# Patient Record
Sex: FEMALE | Race: WHITE
[De-identification: names, ages, dates, MRNs, and addresses within clinical notes are randomized per-mention and may not be internally consistent; named-entity substitution may affect disease eponyms.]

---

## 2017-04-06 ENCOUNTER — HOSPITAL ENCOUNTER (OUTPATIENT)
Dept: HOSPITAL 53 - M WHC | Age: 57
End: 2017-04-06
Attending: NURSE PRACTITIONER
Payer: COMMERCIAL

## 2017-04-06 ENCOUNTER — HOSPITAL ENCOUNTER (OUTPATIENT)
Dept: HOSPITAL 53 - M SFHCWAGY | Age: 57
End: 2017-04-06
Attending: NURSE PRACTITIONER
Payer: COMMERCIAL

## 2017-04-06 DIAGNOSIS — Z78.0: ICD-10-CM

## 2017-04-06 DIAGNOSIS — Z80.3: ICD-10-CM

## 2017-04-06 DIAGNOSIS — Z92.0: ICD-10-CM

## 2017-04-06 DIAGNOSIS — R92.2: Primary | ICD-10-CM

## 2017-04-06 DIAGNOSIS — Z01.419: Primary | ICD-10-CM

## 2017-04-06 NOTE — REPMRS
Patient History

The patient states she had a clinical breast exam in 04/17

Patient is postmenopausal.

Family history of breast cancer in mother under age 50 and breast

cancer in paternal aunt at age 50 or over.

Took unspecified hormones for 1 year.

 

Digital Woman Screen Mammo: April 6, 2017 - Exam #: 

[id number]

Bilateral CC and MLO view(s) were taken.

 

Technologist: Didi Anderson, Technologist

Prior study comparison: February 8, 2016, digital woman screen 

mammo performed at [facility].  January 13, 2015, 

right breast digital mammo diagnostic unilateral, performed at 

[facility].  January 8, 2015, digital woman screen 

mammo performed at [facility].  December 10, 2013, 

digital woman screen mammo performed at [facility].

 

FINDINGS: The breast tissue is heterogeneously dense.  This may 

lower the sensitivity of mammography.  There is a moderate amount

of heterogeneously dense fibroglandular tissue which is fairly 

symmetric. There is no interval development of dominant mass, 

architectural distortion, or clustered microcalcification typical

of malignancy. There has been no change in the appearance of the

mammogram from the prior studies.

 

ASSESSMENT: BI-RADS/ACR category 1 mammogram. Negative.

 

Recommendation

Routine screening mammogram of both breasts in 1 year (for women 

over age 40).

 

This mammogram was interpreted with the aid of an FDA-approved 

computer-aided dectection system.

 

Electronically Signed By: Cesar Enriquez MD 04/06/17 7018

## 2017-06-09 ENCOUNTER — HOSPITAL ENCOUNTER (OUTPATIENT)
Dept: HOSPITAL 53 - M WHC | Age: 57
End: 2017-06-09
Attending: FAMILY MEDICINE
Payer: COMMERCIAL

## 2017-06-09 DIAGNOSIS — M81.0: Primary | ICD-10-CM

## 2017-06-12 NOTE — DEXA
AP SPINE   L1 - L4   1.134   -0.5       0.4

LT FEMUR   TOTAL   0.742   -2.1      -1.4

RT FEMUR   TOTAL   0.709   -2.4      -1.6

TOTAL BODY   TOTAL

OTHER



DUAL FEMUR FRAX* ASSESSMENT

Risk factors:               History hand fracture.

10 year probability of fracture

Major osteoporotic fracture            14.6 %

Hip fracture                 2.9 %



COMMENTS:

Normal bone densitometry of the spine.

There is low bone density of the hips.

The density of the spine has increased 2.0% since the initial exam on 12/22/
2010.

The spine density has increased 3.4% since the most recent exam on 01/08/2015.

The density of the left hip has increased 2.9% since the initial exam on 12/22/
2010.

The density of the left hip has decreased 0.7% since the most recent exam on 01/
08/2015.

The density of the right hip has increased 1.3% since the initial exam on 12/22/
2010.

The density of the right hip has increased 2.6% since the most recent exam on 01
/08/2015.



FOLLOW-UP:

Recommendation for the next bone density exam: 2 years.
ELISABET

## 2017-06-15 ENCOUNTER — HOSPITAL ENCOUNTER (OUTPATIENT)
Dept: HOSPITAL 53 - M SFHCPLAZ | Age: 57
End: 2017-06-15
Attending: FAMILY MEDICINE
Payer: COMMERCIAL

## 2017-06-15 DIAGNOSIS — E55.9: ICD-10-CM

## 2017-06-15 DIAGNOSIS — E03.9: ICD-10-CM

## 2017-06-15 DIAGNOSIS — D72.819: Primary | ICD-10-CM

## 2017-06-15 LAB
ALBUMIN SERPL BCG-MCNC: 3.8 GM/DL (ref 3.2–5.2)
ALBUMIN/GLOB SERPL: 1.31 {RATIO} (ref 1–1.93)
ALP SERPL-CCNC: 57 U/L (ref 45–117)
ALT SERPL W P-5'-P-CCNC: 18 U/L (ref 12–78)
ANION GAP SERPL CALC-SCNC: 6 MEQ/L (ref 8–16)
AST SERPL-CCNC: 17 U/L (ref 15–37)
BASOPHILS NFR BLD MANUAL: 1 % (ref 0–4)
BILIRUB SERPL-MCNC: 0.4 MG/DL (ref 0.2–1)
BUN SERPL-MCNC: 15 MG/DL (ref 7–18)
CALCIUM SERPL-MCNC: 9.4 MG/DL (ref 8.5–10.1)
CHLORIDE SERPL-SCNC: 106 MEQ/L (ref 98–107)
CO2 SERPL-SCNC: 29 MEQ/L (ref 21–32)
CREAT SERPL-MCNC: 0.88 MG/DL (ref 0.55–1.02)
EOSINOPHIL NFR BLD MANUAL: 2 % (ref 0–5)
ERYTHROCYTE [DISTWIDTH] IN BLOOD BY AUTOMATED COUNT: 12.1 % (ref 11.5–14.5)
GFR SERPL CREATININE-BSD FRML MDRD: > 60 ML/MIN/{1.73_M2} (ref 51–?)
GLUCOSE SERPL-MCNC: 54 MG/DL (ref 70–105)
MCH RBC QN AUTO: 32.5 PG (ref 27–33)
MCHC RBC AUTO-ENTMCNC: 32.7 G/DL (ref 32–36.5)
MCV RBC AUTO: 99.5 FL (ref 80–96)
POTASSIUM SERPL-SCNC: 4.7 MEQ/L (ref 3.5–5.1)
PROT SERPL-MCNC: 6.7 GM/DL (ref 6.4–8.2)
RBC MORPH BLD: NORMAL
SODIUM SERPL-SCNC: 141 MEQ/L (ref 136–145)
T4 FREE SERPL-MCNC: 1.08 NG/DL (ref 0.76–1.46)
WBC # BLD AUTO: 5.8 K/MM3 (ref 4–10)

## 2017-12-15 ENCOUNTER — HOSPITAL ENCOUNTER (OUTPATIENT)
Dept: HOSPITAL 53 - M SFHCPLAZ | Age: 57
End: 2017-12-15
Attending: FAMILY MEDICINE
Payer: COMMERCIAL

## 2017-12-15 DIAGNOSIS — D72.819: Primary | ICD-10-CM

## 2017-12-15 DIAGNOSIS — E03.9: ICD-10-CM

## 2017-12-15 DIAGNOSIS — E55.9: ICD-10-CM

## 2017-12-15 LAB
ALBUMIN SERPL BCG-MCNC: 4 GM/DL (ref 3.2–5.2)
ALBUMIN/GLOB SERPL: 1.38 {RATIO} (ref 1–1.93)
ALP SERPL-CCNC: 49 U/L (ref 45–117)
ALT SERPL W P-5'-P-CCNC: 18 U/L (ref 12–78)
ANION GAP SERPL CALC-SCNC: 9 MEQ/L (ref 8–16)
AST SERPL-CCNC: 19 U/L (ref 7–37)
BASOPHILS # BLD AUTO: 0 10^3/UL (ref 0–0.2)
BASOPHILS NFR BLD AUTO: 0.4 % (ref 0–1)
BILIRUB SERPL-MCNC: 0.4 MG/DL (ref 0.2–1)
BUN SERPL-MCNC: 15 MG/DL (ref 7–18)
CALCIUM SERPL-MCNC: 9.1 MG/DL (ref 8.5–10.1)
CHLORIDE SERPL-SCNC: 106 MEQ/L (ref 98–107)
CO2 SERPL-SCNC: 29 MEQ/L (ref 21–32)
CREAT SERPL-MCNC: 0.84 MG/DL (ref 0.55–1.02)
EOSINOPHIL # BLD AUTO: 0.1 10^3/UL (ref 0–0.5)
EOSINOPHIL NFR BLD AUTO: 1.1 % (ref 0–3)
ERYTHROCYTE [DISTWIDTH] IN BLOOD BY AUTOMATED COUNT: 12.5 % (ref 11.5–14.5)
GFR SERPL CREATININE-BSD FRML MDRD: > 60 ML/MIN/{1.73_M2} (ref 51–?)
GLUCOSE SERPL-MCNC: 82 MG/DL (ref 70–105)
IMM GRANULOCYTES NFR BLD: 0.1 % (ref 0–0)
LYMPHOCYTES # BLD AUTO: 1.3 10^3/UL (ref 1.5–4.5)
LYMPHOCYTES NFR BLD AUTO: 17.9 % (ref 24–44)
MCH RBC QN AUTO: 31.5 PG (ref 27–33)
MCHC RBC AUTO-ENTMCNC: 32.2 G/DL (ref 32–36.5)
MCV RBC AUTO: 97.7 FL (ref 80–96)
MONOCYTES # BLD AUTO: 0.7 10^3/UL (ref 0–0.8)
MONOCYTES NFR BLD AUTO: 9.2 % (ref 0–5)
NEUTROPHILS # BLD AUTO: 5 10^3/UL (ref 1.8–7.7)
NEUTROPHILS NFR BLD AUTO: 71.3 % (ref 36–66)
NRBC BLD AUTO-RTO: 0 % (ref 0–0)
PLATELET # BLD AUTO: 283 10^3/UL (ref 150–450)
POTASSIUM SERPL-SCNC: 4.6 MEQ/L (ref 3.5–5.1)
PROT SERPL-MCNC: 6.9 GM/DL (ref 6.4–8.2)
SODIUM SERPL-SCNC: 144 MEQ/L (ref 136–145)
VIT B12 SERPL-MCNC: 636 PG/ML (ref 247–911)
WBC # BLD AUTO: 7 10^3/UL (ref 4–10)

## 2017-12-18 LAB
ALBUMIN MFR UR ELPH: 60.8 % (ref 55.8–66.1)
ALBUMIN SERPL-MCNC: 4.2 GM/DL (ref 3.29–5.55)
GAMMA GLOB 24H MFR UR ELPH: 11.9 % (ref 11.1–18.8)

## 2017-12-19 LAB — PRETREATED FOLATE FOR RBCFOL: 9.6 NG/ML

## 2018-06-11 ENCOUNTER — HOSPITAL ENCOUNTER (OUTPATIENT)
Dept: HOSPITAL 53 - M WHC | Age: 58
End: 2018-06-11
Attending: FAMILY MEDICINE
Payer: COMMERCIAL

## 2018-06-11 ENCOUNTER — HOSPITAL ENCOUNTER (OUTPATIENT)
Dept: HOSPITAL 53 - M SFHCWAGY | Age: 58
End: 2018-06-11
Attending: NURSE PRACTITIONER
Payer: COMMERCIAL

## 2018-06-11 DIAGNOSIS — Z12.4: Primary | ICD-10-CM

## 2018-06-11 DIAGNOSIS — Z12.31: Primary | ICD-10-CM

## 2018-06-11 PROCEDURE — 77067 SCR MAMMO BI INCL CAD: CPT

## 2018-06-12 LAB — HPV LOW VOL RFLX: (no result)

## 2018-08-08 ENCOUNTER — HOSPITAL ENCOUNTER (OUTPATIENT)
Dept: HOSPITAL 53 - M SFHCPLAZ | Age: 58
End: 2018-08-08
Attending: FAMILY MEDICINE
Payer: COMMERCIAL

## 2018-08-08 DIAGNOSIS — D72.819: Primary | ICD-10-CM

## 2018-08-08 DIAGNOSIS — E03.9: ICD-10-CM

## 2018-08-08 LAB
BASO #: 0 10^3/UL (ref 0–0.2)
BASO %: 0.3 % (ref 0–1)
CHOLEST SERPL-MCNC: 160 MG/DL (ref ?–200)
CHOLESTEROL RISK RATIO: 3.08 (ref ?–5)
CRP SERPL-MCNC: < 0.3 MG/DL (ref 0–0.3)
EOS #: 0.1 10^3/UL (ref 0–0.5)
EOSINOPHIL NFR BLD AUTO: 1.5 % (ref 0–3)
FREE T4: 0.91 NG/DL (ref 0.76–1.46)
HDLC SERPL-MCNC: 52 MG/DL (ref 40–?)
HEMATOCRIT: 44.9 % (ref 36–47)
HEMOGLOBIN: 14.8 G/DL (ref 12–15.5)
IMMATURE GRANULOCYTE %: 0.2 % (ref 0–3)
LDL CHOLESTEROL: 90.2 MG/DL (ref ?–100)
LYMPH #: 1.4 10^3/UL (ref 1.5–4.5)
LYMPH %: 23.2 % (ref 24–44)
MEAN CORPUSCULAR HEMOGLOBIN: 31.8 PG (ref 27–33)
MEAN CORPUSCULAR HGB CONC: 33 G/DL (ref 32–36.5)
MEAN CORPUSCULAR VOLUME: 96.6 FL (ref 80–96)
MONO #: 0.5 10^3/UL (ref 0–0.8)
MONO %: 8.9 % (ref 0–5)
NEUTROPHILS #: 3.9 10^3/UL (ref 1.8–7.7)
NEUTROPHILS %: 65.9 % (ref 36–66)
NONHDLC SERPL-MCNC: 108 MG/DL
NRBC BLD AUTO-RTO: 0 % (ref 0–0)
PLATELET COUNT, AUTOMATED: 254 10^3/UL (ref 150–450)
RED BLOOD COUNT: 4.65 10^6/UL (ref 4–5.4)
RED CELL DISTRIBUTION WIDTH: 12.2 % (ref 11.5–14.5)
THYROID STIMULATING HORMONE: 3.02 UIU/ML (ref 0.36–3.74)
TRIGLYCERIDES LEVEL: 89 MG/DL (ref ?–150)
WHITE BLOOD COUNT: 6 10^3/UL (ref 4–10)

## 2019-03-08 ENCOUNTER — HOSPITAL ENCOUNTER (OUTPATIENT)
Dept: HOSPITAL 53 - M SFHCPLAZ | Age: 59
End: 2019-03-08
Attending: FAMILY MEDICINE
Payer: COMMERCIAL

## 2019-03-08 DIAGNOSIS — D72.819: ICD-10-CM

## 2019-03-08 DIAGNOSIS — K58.1: Primary | ICD-10-CM

## 2019-03-08 DIAGNOSIS — E55.9: ICD-10-CM

## 2019-03-08 DIAGNOSIS — E03.9: ICD-10-CM

## 2019-03-08 LAB
25(OH)D3 SERPL-MCNC: 46 NG/ML (ref 30–100)
ALBUMIN SERPL BCG-MCNC: 4.1 GM/DL (ref 3.2–5.2)
ALT SERPL W P-5'-P-CCNC: 17 U/L (ref 12–78)
BASOPHILS # BLD AUTO: 0 10^3/UL (ref 0–0.2)
BASOPHILS NFR BLD AUTO: 0.5 % (ref 0–1)
BILIRUB SERPL-MCNC: 0.5 MG/DL (ref 0.2–1)
BUN SERPL-MCNC: 16 MG/DL (ref 7–18)
CALCIUM SERPL-MCNC: 9.3 MG/DL (ref 8.5–10.1)
CHLORIDE SERPL-SCNC: 106 MEQ/L (ref 98–107)
CO2 SERPL-SCNC: 31 MEQ/L (ref 21–32)
CREAT SERPL-MCNC: 0.81 MG/DL (ref 0.55–1.3)
EOSINOPHIL # BLD AUTO: 0.1 10^3/UL (ref 0–0.5)
EOSINOPHIL NFR BLD AUTO: 2.3 % (ref 0–3)
GFR SERPL CREATININE-BSD FRML MDRD: > 60 ML/MIN/{1.73_M2} (ref 51–?)
GLUCOSE SERPL-MCNC: 86 MG/DL (ref 70–100)
HCT VFR BLD AUTO: 43.1 % (ref 36–47)
HGB BLD-MCNC: 14.2 G/DL (ref 12–15.5)
LYMPHOCYTES # BLD AUTO: 1.2 10^3/UL (ref 1.5–4.5)
LYMPHOCYTES NFR BLD AUTO: 28.6 % (ref 24–44)
MCH RBC QN AUTO: 31.8 PG (ref 27–33)
MCHC RBC AUTO-ENTMCNC: 32.9 G/DL (ref 32–36.5)
MCV RBC AUTO: 96.4 FL (ref 80–96)
MONOCYTES # BLD AUTO: 0.5 10^3/UL (ref 0–0.8)
MONOCYTES NFR BLD AUTO: 11.4 % (ref 0–5)
NEUTROPHILS # BLD AUTO: 2.5 10^3/UL (ref 1.8–7.7)
NEUTROPHILS NFR BLD AUTO: 57 % (ref 36–66)
PLATELET # BLD AUTO: 265 10^3/UL (ref 150–450)
POTASSIUM SERPL-SCNC: 4.7 MEQ/L (ref 3.5–5.1)
PROT SERPL-MCNC: 7 GM/DL (ref 6.4–8.2)
PTH-INTACT SERPL-MCNC: 36.3 PG/ML (ref 18.5–88)
RBC # BLD AUTO: 4.47 10^6/UL (ref 4–5.4)
SODIUM SERPL-SCNC: 142 MEQ/L (ref 136–145)
T4 FREE SERPL-MCNC: 1.08 NG/DL (ref 0.76–1.46)
TSH SERPL DL<=0.005 MIU/L-ACNC: 0.91 UIU/ML (ref 0.36–3.74)
WBC # BLD AUTO: 4.3 10^3/UL (ref 4–10)

## 2019-03-12 LAB — IT SERUM INTERPRETATION: (no result)

## 2019-06-12 ENCOUNTER — HOSPITAL ENCOUNTER (OUTPATIENT)
Dept: HOSPITAL 53 - M WHC | Age: 59
End: 2019-06-12
Attending: FAMILY MEDICINE
Payer: COMMERCIAL

## 2019-06-12 ENCOUNTER — HOSPITAL ENCOUNTER (OUTPATIENT)
Dept: HOSPITAL 53 - M SFHCWAGY | Age: 59
End: 2019-06-12
Attending: NURSE PRACTITIONER
Payer: COMMERCIAL

## 2019-06-12 DIAGNOSIS — Z80.3: ICD-10-CM

## 2019-06-12 DIAGNOSIS — Z12.31: Primary | ICD-10-CM

## 2019-06-12 DIAGNOSIS — Z12.4: Primary | ICD-10-CM

## 2019-06-12 DIAGNOSIS — Z92.23: ICD-10-CM

## 2019-06-12 DIAGNOSIS — N95.2: ICD-10-CM

## 2019-06-12 PROCEDURE — 87624 HPV HI-RISK TYP POOLED RSLT: CPT

## 2019-06-12 NOTE — REPMRS
Patient History

The patient states she had a clinical breast exam in 6/2019.

Family history of breast cancer under age 50 in mother, breast 

cancer at age 50 or over in paternal aunt.

Took unspecified hormones for 1 year.

 

Digital Woman Screen Mammo: June 12, 2019 - Exam #: 

[id number]

Bilateral CC and MLO view(s) were taken.

 

Technologist: Didi Anderson, Technologist

Prior study comparison: June 11, 2018, bilateral digital woman 

screen mammo performed at [facility].  

April 6, 2017, digital woman screen mammo performed at [facility].  February 8, 2016, digital woman screen 

mammo performed at [facility].

 

FINDINGS: The breast tissue is heterogeneously dense.  This may 

lower the sensitivity of mammography.  There is a moderate amount

of heterogeneously dense fibroglandular tissue which is fairly 

symmetric. There is no interval development of dominant mass, 

architectural distortion, or clustered microcalcification typical

of malignancy. There has been no change in the appearance of the

mammogram from the prior studies.

3-D tomosynthesis shows no additional findings.

 

Assessment: BI-RADS/ACR category 1 mammogram. Negative Mammogram.

 

Recommendation

Routine screening mammogram of both breasts in 1 year (for women 

over age 40).

 

This patient's Lifetime Breast Cancer RIsk is estimated at 18.8 

%.

This mammogram was interpreted with the aid of an FDA-approved 

computer-aided dectection system.

 

Electronically Signed By: eCsar Enriquez MD 06/12/19 8366

## 2019-06-26 LAB — HPV LOW VOL RFLX: (no result)

## 2019-09-16 ENCOUNTER — HOSPITAL ENCOUNTER (OUTPATIENT)
Dept: HOSPITAL 53 - M SFHCPLAZ | Age: 59
End: 2019-09-16
Attending: FAMILY MEDICINE
Payer: COMMERCIAL

## 2019-09-16 DIAGNOSIS — E55.9: ICD-10-CM

## 2019-09-16 DIAGNOSIS — D72.819: Primary | ICD-10-CM

## 2019-09-16 DIAGNOSIS — E03.9: ICD-10-CM

## 2019-09-16 LAB
ALBUMIN SERPL BCG-MCNC: 3.7 GM/DL (ref 3.2–5.2)
ALT SERPL W P-5'-P-CCNC: 15 U/L (ref 12–78)
BASOPHILS # BLD AUTO: 0 10^3/UL (ref 0–0.2)
BASOPHILS NFR BLD AUTO: 0.8 % (ref 0–1)
BILIRUB SERPL-MCNC: 0.4 MG/DL (ref 0.2–1)
BUN SERPL-MCNC: 14 MG/DL (ref 7–18)
CALCIUM SERPL-MCNC: 9 MG/DL (ref 8.5–10.1)
CHLORIDE SERPL-SCNC: 106 MEQ/L (ref 98–107)
CHOLEST SERPL-MCNC: 157 MG/DL (ref ?–200)
CHOLEST/HDLC SERPL: 2.8 {RATIO} (ref ?–5)
CO2 SERPL-SCNC: 30 MEQ/L (ref 21–32)
CREAT SERPL-MCNC: 0.85 MG/DL (ref 0.55–1.3)
EOSINOPHIL # BLD AUTO: 0.1 10^3/UL (ref 0–0.5)
EOSINOPHIL NFR BLD AUTO: 2.8 % (ref 0–3)
GFR SERPL CREATININE-BSD FRML MDRD: > 60 ML/MIN/{1.73_M2} (ref 51–?)
GLUCOSE SERPL-MCNC: 87 MG/DL (ref 70–100)
HCT VFR BLD AUTO: 43.1 % (ref 36–47)
HDLC SERPL-MCNC: 56 MG/DL (ref 40–?)
HGB BLD-MCNC: 13.8 G/DL (ref 12–15.5)
LDLC SERPL CALC-MCNC: 83 MG/DL (ref ?–100)
LYMPHOCYTES # BLD AUTO: 1.3 10^3/UL (ref 1.5–5)
LYMPHOCYTES NFR BLD AUTO: 32.2 % (ref 24–44)
MCH RBC QN AUTO: 31.4 PG (ref 27–33)
MCHC RBC AUTO-ENTMCNC: 32 G/DL (ref 32–36.5)
MCV RBC AUTO: 98 FL (ref 80–96)
MONOCYTES # BLD AUTO: 0.5 10^3/UL (ref 0–0.8)
MONOCYTES NFR BLD AUTO: 11.8 % (ref 0–5)
NEUTROPHILS # BLD AUTO: 2.1 10^3/UL (ref 1.5–8.5)
NEUTROPHILS NFR BLD AUTO: 52.1 % (ref 36–66)
NONHDLC SERPL-MCNC: 101 MG/DL
PLATELET # BLD AUTO: 255 10^3/UL (ref 150–450)
POTASSIUM SERPL-SCNC: 4.6 MEQ/L (ref 3.5–5.1)
PROT SERPL-MCNC: 6.8 GM/DL (ref 6.4–8.2)
RBC # BLD AUTO: 4.4 10^6/UL (ref 4–5.4)
SODIUM SERPL-SCNC: 142 MEQ/L (ref 136–145)
T4 FREE SERPL-MCNC: 1.05 NG/DL (ref 0.76–1.46)
TRIGL SERPL-MCNC: 89 MG/DL (ref ?–150)
TSH SERPL DL<=0.005 MIU/L-ACNC: 1.18 UIU/ML (ref 0.36–3.74)
VIT B12 SERPL-MCNC: 598 PG/ML (ref 247–911)
WBC # BLD AUTO: 4 10^3/UL (ref 4–10)

## 2020-01-20 ENCOUNTER — HOSPITAL ENCOUNTER (OUTPATIENT)
Dept: HOSPITAL 53 - M RAD | Age: 60
End: 2020-01-20
Attending: SURGERY
Payer: COMMERCIAL

## 2020-01-20 DIAGNOSIS — I83.813: ICD-10-CM

## 2020-01-20 DIAGNOSIS — I87.2: Primary | ICD-10-CM

## 2020-01-21 NOTE — REP
Clinical:  Venous insufficiency.

 

Technique:  Gray scale and color Doppler evaluation of the bilateral lower

extremities using linear high frequency transducer.  Reflux evaluation

performed.

 

Findings:

Ultrasound examination of the right and left lower extremity deep venous

structures from the common femoral vein to the popliteal vein demonstrates normal

compressibility flow and wave patterns in response to respiration and

augmentation.  There is no evidence for deep venous thrombosis.

 

Right lower extremity demonstrates only minimal reflux in the lesser saphenous

vein with the bed tipped and not on standing.

 

Left lower extremity demonstrates reflux in the common femoral vein and greater

saphenous vein with collateral vessels noted below the level of the knee.

Proximal greater saphenous vein measures 7.3 mm with reflux duration 4.6 seconds;

the mid saphenous vein measures 6.2 mm with reflux duration 5.8 seconds; distal

greater saphenous vein measures 5.5 mm with reflux duration 6.2 seconds.

 

Impression:

No evidence for deep venous thrombosis.

Reflux primarily involving the left lower extremity as noted above.

 

 

Electronically Signed by

Junior Llanes MD 01/21/2020 02:39 A

## 2020-03-18 ENCOUNTER — HOSPITAL ENCOUNTER (OUTPATIENT)
Dept: HOSPITAL 53 - M SFHCPLAZ | Age: 60
End: 2020-03-18
Attending: FAMILY MEDICINE
Payer: COMMERCIAL

## 2020-03-18 DIAGNOSIS — E55.9: ICD-10-CM

## 2020-03-18 DIAGNOSIS — E03.9: ICD-10-CM

## 2020-03-18 DIAGNOSIS — D72.819: Primary | ICD-10-CM

## 2020-03-18 LAB
25(OH)D3 SERPL-MCNC: 48.7 NG/ML (ref 30–100)
ALBUMIN SERPL BCG-MCNC: 3.7 GM/DL (ref 3.2–5.2)
ALT SERPL W P-5'-P-CCNC: 16 U/L (ref 12–78)
BASOPHILS # BLD AUTO: 0 10^3/UL (ref 0–0.2)
BASOPHILS NFR BLD AUTO: 0.6 % (ref 0–1)
BILIRUB SERPL-MCNC: 0.3 MG/DL (ref 0.2–1)
BUN SERPL-MCNC: 15 MG/DL (ref 7–18)
CALCIUM SERPL-MCNC: 9.2 MG/DL (ref 8.5–10.1)
CHLORIDE SERPL-SCNC: 108 MEQ/L (ref 98–107)
CO2 SERPL-SCNC: 31 MEQ/L (ref 21–32)
CREAT SERPL-MCNC: 0.86 MG/DL (ref 0.55–1.3)
EOSINOPHIL # BLD AUTO: 0.1 10^3/UL (ref 0–0.5)
EOSINOPHIL NFR BLD AUTO: 2.6 % (ref 0–3)
GFR SERPL CREATININE-BSD FRML MDRD: > 60 ML/MIN/{1.73_M2} (ref 51–?)
GLUCOSE SERPL-MCNC: 88 MG/DL (ref 70–100)
HCT VFR BLD AUTO: 44 % (ref 36–47)
HGB BLD-MCNC: 13.9 G/DL (ref 12–15.5)
LYMPHOCYTES # BLD AUTO: 1.2 10^3/UL (ref 1.5–5)
LYMPHOCYTES NFR BLD AUTO: 26.3 % (ref 24–44)
MCH RBC QN AUTO: 31.1 PG (ref 27–33)
MCHC RBC AUTO-ENTMCNC: 31.6 G/DL (ref 32–36.5)
MCV RBC AUTO: 98.4 FL (ref 80–96)
MONOCYTES # BLD AUTO: 0.5 10^3/UL (ref 0–0.8)
MONOCYTES NFR BLD AUTO: 10.9 % (ref 0–5)
NEUTROPHILS # BLD AUTO: 2.8 10^3/UL (ref 1.5–8.5)
NEUTROPHILS NFR BLD AUTO: 59.4 % (ref 36–66)
PLATELET # BLD AUTO: 273 10^3/UL (ref 150–450)
POTASSIUM SERPL-SCNC: 4.7 MEQ/L (ref 3.5–5.1)
PROT SERPL-MCNC: 6.7 GM/DL (ref 6.4–8.2)
PTH-INTACT SERPL-MCNC: 26 PG/ML (ref 18.5–88)
RBC # BLD AUTO: 4.47 10^6/UL (ref 4–5.4)
SODIUM SERPL-SCNC: 143 MEQ/L (ref 136–145)
T4 FREE SERPL-MCNC: 1.02 NG/DL (ref 0.76–1.46)
TSH SERPL DL<=0.005 MIU/L-ACNC: 1.58 UIU/ML (ref 0.36–3.74)
WBC # BLD AUTO: 4.7 10^3/UL (ref 4–10)

## 2020-06-16 ENCOUNTER — HOSPITAL ENCOUNTER (OUTPATIENT)
Dept: HOSPITAL 53 - M WHC | Age: 60
End: 2020-06-16
Attending: NURSE PRACTITIONER
Payer: COMMERCIAL

## 2020-06-16 ENCOUNTER — HOSPITAL ENCOUNTER (OUTPATIENT)
Dept: HOSPITAL 53 - M SFHCWAGY | Age: 60
End: 2020-06-16
Attending: NURSE PRACTITIONER
Payer: COMMERCIAL

## 2020-06-16 DIAGNOSIS — Z85.3: ICD-10-CM

## 2020-06-16 DIAGNOSIS — Z12.4: Primary | ICD-10-CM

## 2020-06-16 DIAGNOSIS — Z01.419: ICD-10-CM

## 2020-06-16 DIAGNOSIS — Z12.31: Primary | ICD-10-CM

## 2020-06-16 NOTE — REPMRS
Patient History

The patient states she had a clinical breast exam in June 2020.

Patient is postmenopausal.

Family history of breast cancer under age 50 in mother, breast 

cancer at age 50 or over in paternal aunt.

Took unspecified hormones for 1 year.

 

Digital Woman Screen Mammo: June 16, 2020 - Exam #: 

[id number]

Bilateral CC and MLO view(s) were taken.

 

Technologist: Yanni Griffin, Technologist

Prior study comparison: June 12, 2019, bilateral digital woman 

screen mammo performed at [facility].  June 11, 2018, bilateral digital woman screen mammo

performed at [facility]. 

April 6, 2017, digital woman screen mammo performed at [facility].

 

FINDINGS: The breast tissue is heterogeneously dense.  This may 

lower the sensitivity of mammography.  The Volpara volumetric 

breast density category is: C.  There is a moderate amount of 

heterogeneously dense fibroglandular tissue which is fairly 

symmetric. There is no interval development of dominant mass, 

architectural distortion, or grouped microcalcification typical 

of malignancy. There has been no change in the appearance of the 

mammogram from the prior studies.

3-D tomosynthesis shows no additional findings.

 

Assessment: BI-RADS/ACR category 1 mammogram. Negative Mammogram.

 

Recommendation

Routine screening mammogram of both breasts in 1 year (for women 

over age 40).

This patient's Lifetime Breast Cancer RIsk is estimated at 18.3 

%.

This mammogram was interpreted with the aid of an FDA-approved 

computer-aided dectection system.

 

Electronically Signed By: Cesar Enriquez MD 06/16/20 9424

## 2020-11-17 ENCOUNTER — HOSPITAL ENCOUNTER (OUTPATIENT)
Dept: HOSPITAL 53 - M LABSMTC | Age: 60
End: 2020-11-17
Attending: PEDIATRICS
Payer: SELF-PAY

## 2020-11-17 DIAGNOSIS — Z20.828: Primary | ICD-10-CM

## 2021-01-18 ENCOUNTER — HOSPITAL ENCOUNTER (OUTPATIENT)
Dept: HOSPITAL 53 - M SFHCPLAZ | Age: 61
End: 2021-01-18
Attending: FAMILY MEDICINE
Payer: COMMERCIAL

## 2021-01-18 DIAGNOSIS — D72.819: ICD-10-CM

## 2021-01-18 DIAGNOSIS — E03.9: ICD-10-CM

## 2021-01-18 DIAGNOSIS — E55.9: Primary | ICD-10-CM

## 2021-01-18 LAB
25(OH)D3 SERPL-MCNC: 57.2 NG/ML (ref 30–100)
ALBUMIN SERPL BCG-MCNC: 3.7 GM/DL (ref 3.2–5.2)
ALT SERPL W P-5'-P-CCNC: 20 U/L (ref 12–78)
BASOPHILS # BLD AUTO: 0 10^3/UL (ref 0–0.2)
BASOPHILS NFR BLD AUTO: 0.6 % (ref 0–1)
BILIRUB SERPL-MCNC: 0.4 MG/DL (ref 0.2–1)
BUN SERPL-MCNC: 17 MG/DL (ref 7–18)
CALCIUM SERPL-MCNC: 9.5 MG/DL (ref 8.8–10.2)
CHLORIDE SERPL-SCNC: 106 MEQ/L (ref 98–107)
CHOLEST SERPL-MCNC: 186 MG/DL (ref ?–200)
CHOLEST/HDLC SERPL: 3 {RATIO} (ref ?–5)
CO2 SERPL-SCNC: 31 MEQ/L (ref 21–32)
CREAT SERPL-MCNC: 0.85 MG/DL (ref 0.55–1.3)
EOSINOPHIL # BLD AUTO: 0.1 10^3/UL (ref 0–0.5)
EOSINOPHIL NFR BLD AUTO: 1.8 % (ref 0–3)
GFR SERPL CREATININE-BSD FRML MDRD: > 60 ML/MIN/{1.73_M2} (ref 45–?)
GLUCOSE SERPL-MCNC: 91 MG/DL (ref 70–100)
HCT VFR BLD AUTO: 41.1 % (ref 36–47)
HDLC SERPL-MCNC: 62 MG/DL (ref 40–?)
HGB BLD-MCNC: 12.8 G/DL (ref 12–15.5)
LDLC SERPL CALC-MCNC: 110 MG/DL (ref ?–100)
LYMPHOCYTES # BLD AUTO: 1.1 10^3/UL (ref 1.5–5)
LYMPHOCYTES NFR BLD AUTO: 22.6 % (ref 24–44)
MCH RBC QN AUTO: 31 PG (ref 27–33)
MCHC RBC AUTO-ENTMCNC: 31.1 G/DL (ref 32–36.5)
MCV RBC AUTO: 99.5 FL (ref 80–96)
MONOCYTES # BLD AUTO: 0.5 10^3/UL (ref 0–0.8)
MONOCYTES NFR BLD AUTO: 10.4 % (ref 0–5)
NEUTROPHILS # BLD AUTO: 3.2 10^3/UL (ref 1.5–8.5)
NEUTROPHILS NFR BLD AUTO: 64.4 % (ref 36–66)
NONHDLC SERPL-MCNC: 124 MG/DL
PLATELET # BLD AUTO: 266 10^3/UL (ref 150–450)
POTASSIUM SERPL-SCNC: 4.4 MEQ/L (ref 3.5–5.1)
PROT SERPL-MCNC: 6.5 GM/DL (ref 6.4–8.2)
PTH-INTACT SERPL-MCNC: 27 PG/ML (ref 18.5–88)
RBC # BLD AUTO: 4.13 10^6/UL (ref 4–5.4)
SODIUM SERPL-SCNC: 142 MEQ/L (ref 136–145)
T4 FREE SERPL-MCNC: 1 NG/DL (ref 0.76–1.46)
TRIGL SERPL-MCNC: 68 MG/DL (ref ?–150)
TSH SERPL DL<=0.005 MIU/L-ACNC: 1.14 UIU/ML (ref 0.36–3.74)
VIT B12 SERPL-MCNC: 557 PG/ML (ref 247–911)
WBC # BLD AUTO: 4.9 10^3/UL (ref 4–10)

## 2021-01-22 LAB
ALBUMIN MFR UR ELPH: 62.4 % (ref 55.8–66.1)
ALBUMIN SERPL-MCNC: 4.06 GM/DL (ref 3.29–5.55)
ALPHA1 GLOB MFR SERPL ELPH: 4.1 % (ref 2.9–4.9)
ALPHA1 GLOB SERPL ELPH-MCNC: 0.27 GM/DL (ref 0.17–0.41)
ALPHA2 GLOB SERPL ELPH-MCNC: 0.72 GM/DL (ref 0.42–0.99)
ALPHA2 GLOB SERPL ELPH-MCNC: 11.1 % (ref 7.1–11.8)
B-GLOBULIN SERPL ELPH-MCNC: 0.43 GM/DL (ref 0.28–0.6)
BETA1 GLOB MFR SERPL ELPH: 6.6 % (ref 4.7–7.2)
BETA2 GLOB MFR SERPL ELPH: 5.2 % (ref 3.2–6.5)
BETA2 GLOB SERPL ELPH-MCNC: 0.34 GM/DL (ref 0.19–0.55)
GAMMA GLOB 24H MFR UR ELPH: 10.6 % (ref 11.1–18.8)
GAMMA GLOB SERPL ELPH-MCNC: 0.69 GM/DL (ref 0.65–1.58)
PROT PATTERN SERPL ELPH-IMP: (no result)

## 2021-02-15 ENCOUNTER — HOSPITAL ENCOUNTER (OUTPATIENT)
Dept: HOSPITAL 53 - M WHC | Age: 61
End: 2021-02-15
Attending: NURSE PRACTITIONER
Payer: COMMERCIAL

## 2021-02-15 DIAGNOSIS — N63.21: Primary | ICD-10-CM

## 2021-02-15 DIAGNOSIS — N63.10: ICD-10-CM

## 2021-02-15 NOTE — REP
INDICATION:

R92.2 DENSE BREAST TISSUE ON MAMMOGRAM.



COMPARISON:

Comparison mammography June 16, 2020..



TECHNIQUE:

Bilateral screening whole breast sonography.



FINDINGS:

Right breast sonography demonstrates a hypoechoic spiculated structure casting

acoustic shadowing at the 12 o'clock position in the right breast, 3 cm from the

nipple.  This measures 0.6 x 1.5 x 1.0 cm and is considered suspicious.  The right

breast is otherwise unremarkable.



Left breast scanning shows a small focal calcification at 1 o'clock position which is

not considered suspicious. In the 2 o'clock position, 1 cm from the nipple, there is a

hypoechoic 0.4 x 0.4 x 0.4 cm nodule with well-defined back wall. There is not

enhanced through transmission suggesting that the lesion is solid..



IMPRESSION:

BI-RADS category 4 suspicious bilateral breast ultrasound.  1.5 cm spiculated lesion

in the right breast at 12 o'clock and 0.4 cm hypoechoic solid nodule at 2 o'clock

position in the left breast.  Histologic sampling is recommended.



Recommendation: Ultrasound-guided bilateral breast needle biopsies with clip marker

placement and post clip marker placement mammography.



This patient's estimated Tyrer-Cuzick lifetime risk assessment for breast cancer is

17.8%.





<Electronically signed by Cesar Enriquez > 02/15/21 0809

## 2021-03-03 ENCOUNTER — HOSPITAL ENCOUNTER (OUTPATIENT)
Dept: HOSPITAL 53 - M WHCPRO | Age: 61
End: 2021-03-03
Attending: SURGERY
Payer: COMMERCIAL

## 2021-03-03 VITALS — DIASTOLIC BLOOD PRESSURE: 82 MMHG | SYSTOLIC BLOOD PRESSURE: 128 MMHG

## 2021-03-03 DIAGNOSIS — N60.21: ICD-10-CM

## 2021-03-03 DIAGNOSIS — N60.12: Primary | ICD-10-CM

## 2021-03-03 PROCEDURE — 88305 TISSUE EXAM BY PATHOLOGIST: CPT

## 2021-03-03 PROCEDURE — 19083 BX BREAST 1ST LESION US IMAG: CPT

## 2021-03-03 PROCEDURE — 77066 DX MAMMO INCL CAD BI: CPT

## 2021-03-03 PROCEDURE — 19084 BX BREAST ADD LESION US IMAG: CPT

## 2021-03-03 NOTE — REP
INDICATION:

R92.8 ABN US BAILEE BREAST,POST US GUIDED BAILEE BREAST.



COMPARISON:



06/16/2020 as well as other prior exams.



TECHNIQUE:

MLO and CC views bilateral breasts performed with tomosynthesis.



FINDINGS:

Moderate fibroglandular tissue is present bilaterally, unchanged since the prior exam.

There is no mammographic evidence of mass or suspicious clusters of

microcalcifications.  Coarse benign type calcifications are seen diffusely

bilaterally.  Patient underwent bilateral ultrasound-guided biopsy today.  A biopsy

clip is seen in the upper-outer quadrant of each breast.  No lesions were identified

on ultrasound 02/15/2021 and are not visible mammographically.



The Volpara volumetric breast density pattern is C.



IMPRESSION:

BIRADS/ACR category 1, negative mammogram bilateral breasts.  No mammographic evidence

of mass or clustered microcalcifications.  Biopsy clip deployed in the upper-outer

quadrant of each breast, status post bilateral ultrasound-guided biopsy today.



This patient's Tyrer-Cuzick lifetime breast cancer risk assessment score is 17.8%.



This mammogram was interpreted with the aid of an FDA-approved computer-aided

detection system.



The patient states she had a clinical breast exam in February 2021.





RECOMMENDATION:

Repeat screening mammography recommended 1 year (for women over 40).





<Electronically signed by Wilfredo Hernandez > 03/03/21 1018

## 2021-03-03 NOTE — REP
INDICATION:

R92.8 ABN US BAILEE BREAST,US GUIDED BAILEE BREAST.



COMPARISON:

Ultrasound 02/15/2021.



TECHNIQUE:

Real-time sonographic guidance provided bilaterally for Dr. Roberson.



FINDINGS:

The previously noted lesion in the right breast at 12 o'clock is visualized.  Biopsy

needle is visualized in that region.  The previously noted subcentimeter nodule at 2

o'clock left breast is visualized.  Biopsy needle is seen in that region.





IMPRESSION:

Ultrasound guidance provided bilaterally for Dr. Roberson for ultrasound-guided

biopsy of a lesion in each breast.



RECOMMENDATION:

Clinical follow-up.





<Electronically signed by Wilfredo Hernandez > 03/03/21 0427

## 2021-03-04 NOTE — ROOPDOC
[facility] Report Of Operation


Report of Operation


DATE OF PROCEDURE: 3/3/21


DIAGNOSIS: bilateral breasts suspicious lesions


PROCEDURE: Ultrasound guided biopsy of  bilateral breasts suspicious lesions 

with clips placement 


SURGEON: Sheyla Suarez


BLOOD LOSS: minimal


COMPLICATIONS: none





Lidocaine 1% LOT [id number] Expiration  4/2024 


Sodium Bicarbonate 8.4% LOT -EV Expiration 4/2021 


RIGHT BREAST BIOPSY:


Hydromark clip LOT [id number] Expiration 6/2023  SHAPE :  3


Bx device: BARD [id number] x10 cm  LOT [id number] Expiration  11/2023  


LEFT BREAST BIOPSY:


Hydromark clip LOT [id number] Expiration 6/2023  SHAPE :  3


Bx device: BARD [id number] x10 cm  LOT [id number] Expiration  11/2023  








Informed consent was obtained. The most common risk and possible complications 

including bleeding, hematoma, bruising, infection, injury to surrounding 

structures were explained to the patient and the patient expressed 

understanding. 





Patient was placed on the bed in the supine position.  Appropriate time out was 

done stating patients name, date of birth, and the procedure to be performed. 


The procedure was started on the right side. The right breast was prepped and 

draped in the usual fashion. The ultrasound was used to confirm the location of 

the lesion in the right breast at 12:00 3 centimeters from the nipple with 

acoustic shadowing 





Plain Lidocaine 1% and 8.4% sodium bicarbonate 10:1 mix was used to anesthetize 

the skin, the biopsy site and tissues along the anticipated biopsy tract. Small 

skin incision was made with blade number 11.  BARD Marquee 14G cannula with 

introducer ([id number]) was inserted through the incision and advanced under the 

ultrasound guidance to position immediately adjacent to the lesion. Next, the 

introducer was removed and BARD Marquee 14G biopsy device was places in the 

cannula. Pre-biopsy imaging, and post-biopsy imaging were captured. Five good 

core biopsies were taken at various levels of the lesion. Specimen was placed in

formaldehyde, labeled with appropriate biopsy site and patients name, and sent 

to pathology for evaluation.





Next, the biopsy device was withdrawn and a clip introducer was inserted into 

the biopsy site via the cannula. The SHAPE 3 Hydromark clip was deployed under 

sonographic guidance. Post-clip placement image was captured. 





Manual pressure over the biopsy cavity and tract was held after the clip introd

ucer was withdrawn. No bleeding was noted upon removal of the pressure. 





At this time, our attention was turned toward the left breast. 


The left breast was prepped and draped in the usual fashion. The ultrasound was 

used to confirm the location of the lesion in the left breast at 2:00 1 

centimeters from the nipple. 





Plain Lidocaine 1% and 8.4% sodium bicarbonate 10:1 mix was used to anesthetize 

the skin, the biopsy site and tissues along the anticipated biopsy tract. Small 

skin incision was made with blade number 11.  BARD Marquee 14G cannula with 

introducer ([id number]) was inserted through the incision and advanced under the 

ultrasound guidance to position immediately adjacent to the lesion. Next, the 

introducer was removed and BARD Marquee 14G biopsy device was places in the 

cannula. Pre-biopsy imaging, and post-biopsy imaging were captured. Five good 

core biopsies were taken at various levels of the lesion. Specimen was placed in

formaldehyde, labeled with appropriate biopsy site and patients name, and sent 

to pathology for evaluation.





Next, the biopsy device was withdrawn and a clip introducer was inserted into 

the biopsy site via the cannula. The SHAPE 3 Hydromark clip was deployed under 

sonographic guidance. Post-clip placement image was captured. 





Manual pressure over the biopsy cavity and tract was held after the clip 

introducer was withdrawn. No bleeding was noted upon removal of the pressure. 





Post-biopsy mammogram of the b/l breasts was obtained and showed clips in 

expected positions. Postprocedural dressing was placed.





Patient tolerated procedure well. Discharge instructions were discussed with the

patient and the patient expressed understanding.











SHEYLA SUAREZ DO      Mar 4, 2021 17:01

## 2021-03-08 ENCOUNTER — HOSPITAL ENCOUNTER (OUTPATIENT)
Dept: HOSPITAL 53 - M PLAIMG | Age: 61
End: 2021-03-08
Attending: PHYSICIAN ASSISTANT
Payer: COMMERCIAL

## 2021-03-08 DIAGNOSIS — M79.622: Primary | ICD-10-CM

## 2021-04-06 ENCOUNTER — HOSPITAL ENCOUNTER (OUTPATIENT)
Dept: HOSPITAL 53 - M WHC | Age: 61
End: 2021-04-06
Attending: FAMILY MEDICINE
Payer: COMMERCIAL

## 2021-04-06 DIAGNOSIS — M85.851: Primary | ICD-10-CM

## 2021-04-06 DIAGNOSIS — M81.0: ICD-10-CM

## 2021-04-06 NOTE — DEXAMM
INDICATION:

M81.0 OSTEOPOROSIS.



COMPARISON:

06/09/2017 as well as other prior exams.



TECHNIQUE:

Bone density was measured using dual-energy x-ray absorptiometry (DEXA).



FINDINGS:

AP SPINE L1-L4

BMD 1.181 g/cm2

Young Adult T-Score -0.1

Age Matched Z-Score 1.1.



LT FEMUR, TOTAL

BMD 0.760 g/cm2

Young Adult T-Score -2.0

Age Matched Z-Score -1.0.



LT NECK

BMD 0.722 g/cm2

Young Adult T-Score -2.3

Age Matched Z-Score -1.0.



RT FEMUR, TOTAL

BMD 0.740 g/cm2

Young Adult T-Score -2.1

Age Matched Z-Score -1.2.



RT NECK

BMD 0.746 g/cm2

Young Adult T-Score -2.1

Age Matched Z-Score -0.9.



IMPRESSION:

There is normal bone density of the spine.



There is low bone density of the left hip.





There is low bone density of the right hip.



The density of the spine has increased 6.2% since the initial exam on 12/22/2010.





The density of the spine increased 4.1% since most recent exam on 06/09/2017.





The density of the left hip has increased 5.4% since initial exam on 12/22/2010.





The density of the left hip has increased 2.4% since most recent exam on 06/09/2017.





The density of the right hip has increased 5.7% since the initial exam on 12/22/2010.





The density of the right hip has increased 4.4% since the most recent exam on

06/09/2017.



FOLLOW-UP:

Recommendation for the next bone density exam: 2 years.





<Electronically signed by Wilfredo Hernandez > 04/06/21 7401

## 2021-06-22 ENCOUNTER — HOSPITAL ENCOUNTER (OUTPATIENT)
Dept: HOSPITAL 53 - M SFHCWAGY | Age: 61
End: 2021-06-22
Attending: NURSE PRACTITIONER
Payer: COMMERCIAL

## 2021-06-22 DIAGNOSIS — Z01.419: ICD-10-CM

## 2021-06-22 DIAGNOSIS — Z12.4: Primary | ICD-10-CM

## 2021-08-05 ENCOUNTER — HOSPITAL ENCOUNTER (OUTPATIENT)
Dept: HOSPITAL 53 - M PLALAB | Age: 61
End: 2021-08-05
Attending: FAMILY MEDICINE
Payer: COMMERCIAL

## 2021-08-05 DIAGNOSIS — E03.9: Primary | ICD-10-CM

## 2021-08-05 LAB
25(OH)D3 SERPL-MCNC: 46.8 NG/ML (ref 30–100)
ALBUMIN SERPL BCG-MCNC: 3.8 GM/DL (ref 3.2–5.2)
ALT SERPL W P-5'-P-CCNC: 20 U/L (ref 12–78)
BASOPHILS # BLD AUTO: 0 10^3/UL (ref 0–0.2)
BASOPHILS NFR BLD AUTO: 0.8 % (ref 0–1)
BILIRUB SERPL-MCNC: 0.3 MG/DL (ref 0.2–1)
BUN SERPL-MCNC: 18 MG/DL (ref 7–18)
CALCIUM SERPL-MCNC: 9.2 MG/DL (ref 8.8–10.2)
CHLORIDE SERPL-SCNC: 109 MEQ/L (ref 98–107)
CO2 SERPL-SCNC: 29 MEQ/L (ref 21–32)
CREAT SERPL-MCNC: 0.71 MG/DL (ref 0.55–1.3)
EOSINOPHIL # BLD AUTO: 0.1 10^3/UL (ref 0–0.5)
EOSINOPHIL NFR BLD AUTO: 2 % (ref 0–3)
GFR SERPL CREATININE-BSD FRML MDRD: > 60 ML/MIN/{1.73_M2} (ref 45–?)
GLUCOSE SERPL-MCNC: 90 MG/DL (ref 70–100)
HCT VFR BLD AUTO: 44.5 % (ref 36–47)
HGB BLD-MCNC: 14.1 G/DL (ref 12–15.5)
LYMPHOCYTES # BLD AUTO: 1.2 10^3/UL (ref 1.5–5)
LYMPHOCYTES NFR BLD AUTO: 23.4 % (ref 24–44)
MCH RBC QN AUTO: 31 PG (ref 27–33)
MCHC RBC AUTO-ENTMCNC: 31.7 G/DL (ref 32–36.5)
MCV RBC AUTO: 97.8 FL (ref 80–96)
MONOCYTES # BLD AUTO: 0.6 10^3/UL (ref 0–0.8)
MONOCYTES NFR BLD AUTO: 10.8 % (ref 2–8)
NEUTROPHILS # BLD AUTO: 3.2 10^3/UL (ref 1.5–8.5)
NEUTROPHILS NFR BLD AUTO: 62.8 % (ref 36–66)
PLATELET # BLD AUTO: 250 10^3/UL (ref 150–450)
POTASSIUM SERPL-SCNC: 4.7 MEQ/L (ref 3.5–5.1)
PROT SERPL-MCNC: 6.8 GM/DL (ref 6.4–8.2)
PTH-INTACT SERPL-MCNC: 33 PG/ML (ref 18.5–88)
RBC # BLD AUTO: 4.55 10^6/UL (ref 4–5.4)
SODIUM SERPL-SCNC: 143 MEQ/L (ref 136–145)
T4 FREE SERPL-MCNC: 0.99 NG/DL (ref 0.76–1.46)
TSH SERPL DL<=0.005 MIU/L-ACNC: 1.14 UIU/ML (ref 0.36–3.74)
WBC # BLD AUTO: 5.1 10^3/UL (ref 4–10)

## 2021-08-18 ENCOUNTER — HOSPITAL ENCOUNTER (EMERGENCY)
Dept: HOSPITAL 53 - M ED | Age: 61
Discharge: HOME | End: 2021-08-18
Payer: COMMERCIAL

## 2021-08-18 VITALS — HEIGHT: 66 IN | WEIGHT: 121.7 LBS | BODY MASS INDEX: 19.56 KG/M2

## 2021-08-18 VITALS — DIASTOLIC BLOOD PRESSURE: 91 MMHG | SYSTOLIC BLOOD PRESSURE: 153 MMHG

## 2021-08-18 DIAGNOSIS — Z79.890: ICD-10-CM

## 2021-08-18 DIAGNOSIS — R42: Primary | ICD-10-CM

## 2021-08-18 DIAGNOSIS — J30.89: ICD-10-CM

## 2021-08-18 DIAGNOSIS — E03.9: ICD-10-CM

## 2021-08-18 DIAGNOSIS — M81.0: ICD-10-CM

## 2021-08-18 DIAGNOSIS — Z79.899: ICD-10-CM

## 2021-08-18 DIAGNOSIS — E87.5: ICD-10-CM

## 2021-08-18 LAB
ALBUMIN SERPL BCG-MCNC: 3.8 GM/DL (ref 3.2–5.2)
ALT SERPL W P-5'-P-CCNC: 22 U/L (ref 12–78)
BASOPHILS # BLD AUTO: 0 10^3/UL (ref 0–0.2)
BASOPHILS NFR BLD AUTO: 0.4 % (ref 0–1)
BILIRUB SERPL-MCNC: 0.4 MG/DL (ref 0.2–1)
BUN SERPL-MCNC: 14 MG/DL (ref 7–18)
CALCIUM SERPL-MCNC: 9.3 MG/DL (ref 8.8–10.2)
CHLORIDE SERPL-SCNC: 110 MEQ/L (ref 98–107)
CO2 SERPL-SCNC: 26 MEQ/L (ref 21–32)
CREAT SERPL-MCNC: 0.76 MG/DL (ref 0.55–1.3)
EOSINOPHIL # BLD AUTO: 0.1 10^3/UL (ref 0–0.5)
EOSINOPHIL NFR BLD AUTO: 1.2 % (ref 0–3)
GFR SERPL CREATININE-BSD FRML MDRD: > 60 ML/MIN/{1.73_M2} (ref 45–?)
GLUCOSE SERPL-MCNC: 86 MG/DL (ref 70–100)
HCT VFR BLD AUTO: 45.8 % (ref 36–47)
HGB BLD-MCNC: 14.9 G/DL (ref 12–15.5)
LYMPHOCYTES # BLD AUTO: 1.8 10^3/UL (ref 1.5–5)
LYMPHOCYTES NFR BLD AUTO: 26.1 % (ref 24–44)
MCH RBC QN AUTO: 31.4 PG (ref 27–33)
MCHC RBC AUTO-ENTMCNC: 32.5 G/DL (ref 32–36.5)
MCV RBC AUTO: 96.4 FL (ref 80–96)
MONOCYTES # BLD AUTO: 0.6 10^3/UL (ref 0–0.8)
MONOCYTES NFR BLD AUTO: 8.8 % (ref 2–8)
NEUTROPHILS # BLD AUTO: 4.3 10^3/UL (ref 1.5–8.5)
NEUTROPHILS NFR BLD AUTO: 63.4 % (ref 36–66)
PLATELET # BLD AUTO: 258 10^3/UL (ref 150–450)
POTASSIUM SERPL-SCNC: 5.2 MEQ/L (ref 3.5–5.1)
PROT SERPL-MCNC: 7.1 GM/DL (ref 6.4–8.2)
RBC # BLD AUTO: 4.75 10^6/UL (ref 4–5.4)
SODIUM SERPL-SCNC: 141 MEQ/L (ref 136–145)
TSH SERPL DL<=0.005 MIU/L-ACNC: 0.71 UIU/ML (ref 0.36–3.74)
WBC # BLD AUTO: 6.8 10^3/UL (ref 4–10)

## 2021-08-18 NOTE — ECGEPIP
[facility] - ED

                                       

                                       Test Date:    2021

Pat Name:     RAFIA GONZALES             Department:   

Patient ID:   [id number]                 Room:         -

Gender:       Female                   Technician:   CHRISTIANO

:          1960               Requested By: DARRYL TRUONG

Order Number: [id number]     Reading MD:   Berna Underwood

                                 Measurements

Intervals                              Axis          

Rate:         59                       P:            80

ID:           [id number]                      QRS:          56

QRSD:         104                      T:            77

QT:           408                                    

QTc:          403                                    

                           Interpretive Statements

Sinus bradycardia

Incomplete right bundle branch block

No prior

Electronically Signed on 2021 20:39:58 EDT by Berna Underwood

## 2021-08-18 NOTE — REPVR
PROCEDURE INFORMATION: 

Exam: CT Head Without Contrast 

Exam date and time: 8/18/2021 7:18 PM 

Age: 60 years old 

Clinical indication: Other:  shunt, 6 days ataxia 



TECHNIQUE: 

Imaging protocol: Computed tomography of the head without contrast. 

Radiation optimization: All CT scans at this facility use at least one of these 

dose optimization techniques: automated exposure control; mA and/or kV 

adjustment per patient size (includes targeted exams where dose is matched to 

clinical indication); or iterative reconstruction. 



COMPARISON: 

No relevant prior studies available. 



FINDINGS: 

Tubes, catheters and devices: Right parietal approach ventriculostomy catheter 

with its tip in the left lateral ventricle. 



Brain: Hyperdensity in the foramina of Monro measuring 5 mm, likely 

representing a colloid cyst. The gray-white differentiation is maintained. No 

hemorrhage. No edema.

Cerebral ventricles: No ventriculomegaly. 

Paranasal sinuses: Visualized sinuses are unremarkable. No fluid levels. 

Mastoid air cells: Visualized mastoid air cells are well aerated. 

Bones/joints: Unremarkable. No acute fracture. 

Soft tissues: Unremarkable. 



IMPRESSION: 

No acute intracranial abnormality.

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 

colloid cyst.



Electronically signed by: Johnathon Gillette On 08/18/2021  19:26:37 PM

## 2021-08-19 NOTE — ED PDOC
Post-Departure Follow-Up


ct head faxed to dr riggins for fu mlg Lundborg-Gray,Maja MD          Aug 19, 2021 10:47

## 2021-09-01 ENCOUNTER — HOSPITAL ENCOUNTER (OUTPATIENT)
Dept: HOSPITAL 53 - M WHC | Age: 61
End: 2021-09-01
Attending: SURGERY
Payer: COMMERCIAL

## 2021-09-01 DIAGNOSIS — R92.8: Primary | ICD-10-CM

## 2021-09-01 NOTE — REP
INDICATION:

6 MO F/U ASSESS STABILITY/R92.8 ABN U/S OF BREAST. Status post benign

ultrasound-guided breast biopsy for a target in each breast.



COMPARISON:

Comparison sonography March 3, 2021, date of bilateral ultrasound-guided needle biopsy

as well as February 15, 2021.



TECHNIQUE:

Targeted bilateral breast sonography.



FINDINGS:

In the right breast, scanning at the 12 o'clock position demonstrates a 1.0 x 0.8 x

0.7 cm hypoechoic area adjacent to the previously placed HydroMARK clip device.  This

is smaller than but otherwise unchanged from the appearance of the original biopsy

target in the right breast.  It is 2.4 cm from nipple.



In the left breast a HydroMARK clip device is seen in the 2 o'clock position, 1 cm

from the nipple.  The previously noted ultrasound target is no longer apparent in the

left breast.



IMPRESSION:

BI-RADS category 2 benign findings.





<Electronically signed by Cesar Enriquez > 09/01/21 1168

## 2021-12-13 ENCOUNTER — HOSPITAL ENCOUNTER (OUTPATIENT)
Dept: HOSPITAL 53 - M PLALAB | Age: 61
End: 2021-12-13
Attending: FAMILY MEDICINE
Payer: COMMERCIAL

## 2021-12-13 DIAGNOSIS — E03.9: Primary | ICD-10-CM

## 2021-12-13 LAB
BASOPHILS # BLD AUTO: 0 10^3/UL (ref 0–0.2)
BASOPHILS NFR BLD AUTO: 0.5 % (ref 0–1)
CHOLEST SERPL-MCNC: 155 MG/DL (ref ?–200)
CHOLEST/HDLC SERPL: 2.54 {RATIO} (ref ?–5)
CRP SERPL-MCNC: < 0.3 MG/DL (ref 0–0.3)
EOSINOPHIL # BLD AUTO: 0.1 10^3/UL (ref 0–0.5)
EOSINOPHIL NFR BLD AUTO: 1.6 % (ref 0–3)
HCT VFR BLD AUTO: 42.8 % (ref 36–47)
HDLC SERPL-MCNC: 61 MG/DL (ref 40–?)
HGB BLD-MCNC: 13.5 G/DL (ref 12–15.5)
LDLC SERPL CALC-MCNC: 81 MG/DL (ref ?–100)
LYMPHOCYTES # BLD AUTO: 1.4 10^3/UL (ref 1.5–5)
LYMPHOCYTES NFR BLD AUTO: 25.3 % (ref 24–44)
MCH RBC QN AUTO: 31 PG (ref 27–33)
MCHC RBC AUTO-ENTMCNC: 31.5 G/DL (ref 32–36.5)
MCV RBC AUTO: 98.2 FL (ref 80–96)
MONOCYTES # BLD AUTO: 0.5 10^3/UL (ref 0–0.8)
MONOCYTES NFR BLD AUTO: 9.8 % (ref 2–8)
NEUTROPHILS # BLD AUTO: 3.4 10^3/UL (ref 1.5–8.5)
NEUTROPHILS NFR BLD AUTO: 62.4 % (ref 36–66)
NONHDLC SERPL-MCNC: 94 MG/DL
PLATELET # BLD AUTO: 242 10^3/UL (ref 150–450)
PROT SERPL-MCNC: 6.5 GM/DL (ref 6.4–8.2)
RBC # BLD AUTO: 4.36 10^6/UL (ref 4–5.4)
T4 FREE SERPL-MCNC: 1.01 NG/DL (ref 0.76–1.46)
TRIGL SERPL-MCNC: 64 MG/DL (ref ?–150)
TSH SERPL DL<=0.005 MIU/L-ACNC: 1.13 UIU/ML (ref 0.36–3.74)
VIT B12 SERPL-MCNC: 269 PG/ML (ref 247–911)
WBC # BLD AUTO: 5.5 10^3/UL (ref 4–10)

## 2022-06-14 ENCOUNTER — HOSPITAL ENCOUNTER (OUTPATIENT)
Dept: HOSPITAL 53 - M PLALAB | Age: 62
End: 2022-06-14
Attending: FAMILY MEDICINE
Payer: COMMERCIAL

## 2022-06-14 DIAGNOSIS — D75.89: ICD-10-CM

## 2022-06-14 DIAGNOSIS — E55.9: ICD-10-CM

## 2022-06-14 DIAGNOSIS — E53.8: Primary | ICD-10-CM

## 2022-06-14 LAB
25(OH)D3 SERPL-MCNC: 53.4 NG/ML (ref 30–100)
ALBUMIN SERPL BCG-MCNC: 3.4 GM/DL (ref 3.2–5.2)
ALT SERPL W P-5'-P-CCNC: 17 U/L (ref 12–78)
BASOPHILS # BLD AUTO: 0 10^3/UL (ref 0–0.2)
BASOPHILS NFR BLD AUTO: 0.4 % (ref 0–1)
BILIRUB SERPL-MCNC: 0.5 MG/DL (ref 0.2–1)
BUN SERPL-MCNC: 11 MG/DL (ref 7–18)
CALCIUM SERPL-MCNC: 9.4 MG/DL (ref 8.8–10.2)
CHLORIDE SERPL-SCNC: 109 MEQ/L (ref 98–107)
CO2 SERPL-SCNC: 30 MEQ/L (ref 21–32)
CREAT SERPL-MCNC: 0.82 MG/DL (ref 0.55–1.3)
EOSINOPHIL # BLD AUTO: 0.1 10^3/UL (ref 0–0.5)
EOSINOPHIL NFR BLD AUTO: 1.8 % (ref 0–3)
FERRITIN SERPL-MCNC: 24 NG/ML (ref 8–252)
GFR SERPL CREATININE-BSD FRML MDRD: > 60 ML/MIN/{1.73_M2} (ref 45–?)
GLUCOSE SERPL-MCNC: 96 MG/DL (ref 70–100)
HCT VFR BLD AUTO: 41.8 % (ref 36–47)
HCT VFR BLD AUTO: 43 % (ref 36–47)
HGB BLD-MCNC: 13.5 G/DL (ref 12–15.5)
LYMPHOCYTES # BLD AUTO: 1.2 10^3/UL (ref 1.5–5)
LYMPHOCYTES NFR BLD AUTO: 24.2 % (ref 24–44)
MCH RBC QN AUTO: 31 PG (ref 27–33)
MCHC RBC AUTO-ENTMCNC: 31.4 G/DL (ref 32–36.5)
MCV RBC AUTO: 98.9 FL (ref 80–96)
MONOCYTES # BLD AUTO: 0.5 10^3/UL (ref 0–0.8)
MONOCYTES NFR BLD AUTO: 10.4 % (ref 2–8)
NEUTROPHILS # BLD AUTO: 3.2 10^3/UL (ref 1.5–8.5)
NEUTROPHILS NFR BLD AUTO: 63 % (ref 36–66)
PLATELET # BLD AUTO: 297 10^3/UL (ref 150–450)
POTASSIUM SERPL-SCNC: 4.3 MEQ/L (ref 3.5–5.1)
PROT SERPL-MCNC: 6.5 GM/DL (ref 6.4–8.2)
PTH-INTACT SERPL-MCNC: 33.3 PG/ML (ref 18.5–88)
RBC # BLD AUTO: 4.35 10^6/UL (ref 4–5.4)
SODIUM SERPL-SCNC: 143 MEQ/L (ref 136–145)
VIT B12 SERPL-MCNC: 843 PG/ML (ref 247–911)
WBC # BLD AUTO: 5.1 10^3/UL (ref 4–10)

## 2022-08-24 ENCOUNTER — HOSPITAL ENCOUNTER (EMERGENCY)
Dept: HOSPITAL 53 - M ED | Age: 62
LOS: 1 days | Discharge: LEFT BEFORE DISCHARGE | End: 2022-08-25
Payer: COMMERCIAL

## 2022-08-24 VITALS — BODY MASS INDEX: 19.36 KG/M2 | HEIGHT: 66.5 IN | WEIGHT: 121.92 LBS

## 2022-08-24 VITALS — SYSTOLIC BLOOD PRESSURE: 150 MMHG | DIASTOLIC BLOOD PRESSURE: 110 MMHG

## 2022-08-24 DIAGNOSIS — Z53.29: Primary | ICD-10-CM

## 2022-08-25 ENCOUNTER — HOSPITAL ENCOUNTER (OUTPATIENT)
Dept: HOSPITAL 53 - M ED | Age: 62
LOS: 1 days | Discharge: HOME | End: 2022-08-26
Attending: SURGERY
Payer: COMMERCIAL

## 2022-08-25 VITALS — BODY MASS INDEX: 19.38 KG/M2 | HEIGHT: 66 IN | WEIGHT: 120.59 LBS

## 2022-08-25 VITALS — DIASTOLIC BLOOD PRESSURE: 72 MMHG | SYSTOLIC BLOOD PRESSURE: 108 MMHG

## 2022-08-25 VITALS — SYSTOLIC BLOOD PRESSURE: 106 MMHG | DIASTOLIC BLOOD PRESSURE: 79 MMHG

## 2022-08-25 VITALS — SYSTOLIC BLOOD PRESSURE: 103 MMHG | DIASTOLIC BLOOD PRESSURE: 56 MMHG

## 2022-08-25 DIAGNOSIS — K56.7: ICD-10-CM

## 2022-08-25 DIAGNOSIS — Z98.2: ICD-10-CM

## 2022-08-25 DIAGNOSIS — R29.818: ICD-10-CM

## 2022-08-25 DIAGNOSIS — Z90.89: ICD-10-CM

## 2022-08-25 DIAGNOSIS — K38.8: ICD-10-CM

## 2022-08-25 DIAGNOSIS — K35.891: Primary | ICD-10-CM

## 2022-08-25 DIAGNOSIS — J30.1: ICD-10-CM

## 2022-08-25 DIAGNOSIS — K58.9: ICD-10-CM

## 2022-08-25 DIAGNOSIS — K92.9: ICD-10-CM

## 2022-08-25 LAB
ALBUMIN SERPL BCG-MCNC: 3.7 GM/DL (ref 3.2–5.2)
ALBUMIN SERPL BCG-MCNC: 4 GM/DL (ref 3.2–5.2)
ALT SERPL W P-5'-P-CCNC: 14 U/L (ref 12–78)
ALT SERPL W P-5'-P-CCNC: 18 U/L (ref 12–78)
AMYLASE SERPL-CCNC: 60 U/L (ref 25–115)
BASOPHILS # BLD AUTO: 0 10^3/UL (ref 0–0.2)
BASOPHILS # BLD AUTO: 0 10^3/UL (ref 0–0.2)
BASOPHILS NFR BLD AUTO: 0.1 % (ref 0–1)
BASOPHILS NFR BLD AUTO: 0.2 % (ref 0–1)
BILIRUB CONJ SERPL-MCNC: 0.2 MG/DL (ref 0–0.2)
BILIRUB SERPL-MCNC: 0.8 MG/DL (ref 0.2–1)
BILIRUB SERPL-MCNC: 1 MG/DL (ref 0.2–1)
BUN SERPL-MCNC: 12 MG/DL (ref 7–18)
BUN SERPL-MCNC: 8 MG/DL (ref 7–18)
CALCIUM SERPL-MCNC: 9.2 MG/DL (ref 8.8–10.2)
CALCIUM SERPL-MCNC: 9.2 MG/DL (ref 8.8–10.2)
CHLORIDE SERPL-SCNC: 105 MEQ/L (ref 98–107)
CHLORIDE SERPL-SCNC: 99 MEQ/L (ref 98–107)
CK MB CFR.DF SERPL CALC: 0.75
CK MB SERPL-MCNC: < 1 NG/ML (ref ?–3.6)
CK SERPL-CCNC: 134 U/L (ref 26–192)
CO2 SERPL-SCNC: 26 MEQ/L (ref 21–32)
CO2 SERPL-SCNC: 28 MEQ/L (ref 21–32)
CREAT SERPL-MCNC: 0.9 MG/DL (ref 0.55–1.3)
CREAT SERPL-MCNC: 0.91 MG/DL (ref 0.55–1.3)
EOSINOPHIL # BLD AUTO: 0 10^3/UL (ref 0–0.5)
EOSINOPHIL # BLD AUTO: 0 10^3/UL (ref 0–0.5)
EOSINOPHIL NFR BLD AUTO: 0.1 % (ref 0–3)
EOSINOPHIL NFR BLD AUTO: 0.1 % (ref 0–3)
GFR SERPL CREATININE-BSD FRML MDRD: > 60 ML/MIN/{1.73_M2} (ref 45–?)
GFR SERPL CREATININE-BSD FRML MDRD: > 60 ML/MIN/{1.73_M2} (ref 45–?)
GLUCOSE SERPL-MCNC: 101 MG/DL (ref 70–100)
GLUCOSE SERPL-MCNC: 75 MG/DL (ref 70–100)
HCT VFR BLD AUTO: 41.2 % (ref 36–47)
HCT VFR BLD AUTO: 42 % (ref 36–47)
HGB BLD-MCNC: 13.7 G/DL (ref 12–15.5)
HGB BLD-MCNC: 13.9 G/DL (ref 12–15.5)
LIPASE SERPL-CCNC: 131 U/L (ref 73–393)
LIPASE SERPL-CCNC: 161 U/L (ref 73–393)
LYMPHOCYTES # BLD AUTO: 0.9 10^3/UL (ref 1.5–5)
LYMPHOCYTES # BLD AUTO: 1.2 10^3/UL (ref 1.5–5)
LYMPHOCYTES NFR BLD AUTO: 5.9 % (ref 24–44)
LYMPHOCYTES NFR BLD AUTO: 7.8 % (ref 24–44)
MCH RBC QN AUTO: 32.2 PG (ref 27–33)
MCH RBC QN AUTO: 32.3 PG (ref 27–33)
MCHC RBC AUTO-ENTMCNC: 33.1 G/DL (ref 32–36.5)
MCHC RBC AUTO-ENTMCNC: 33.3 G/DL (ref 32–36.5)
MCV RBC AUTO: 96.9 FL (ref 80–96)
MCV RBC AUTO: 97.4 FL (ref 80–96)
MONOCYTES # BLD AUTO: 1.1 10^3/UL (ref 0–0.8)
MONOCYTES # BLD AUTO: 1.1 10^3/UL (ref 0–0.8)
MONOCYTES NFR BLD AUTO: 6.9 % (ref 2–8)
MONOCYTES NFR BLD AUTO: 7.3 % (ref 2–8)
NEUTROPHILS # BLD AUTO: 13 10^3/UL (ref 1.5–8.5)
NEUTROPHILS # BLD AUTO: 13.3 10^3/UL (ref 1.5–8.5)
NEUTROPHILS NFR BLD AUTO: 84.2 % (ref 36–66)
NEUTROPHILS NFR BLD AUTO: 86.5 % (ref 36–66)
PLATELET # BLD AUTO: 283 10^3/UL (ref 150–450)
PLATELET # BLD AUTO: 284 10^3/UL (ref 150–450)
POTASSIUM SERPL-SCNC: 3.9 MEQ/L (ref 3.5–5.1)
POTASSIUM SERPL-SCNC: 4 MEQ/L (ref 3.5–5.1)
PROT SERPL-MCNC: 7 GM/DL (ref 6.4–8.2)
PROT SERPL-MCNC: 7 GM/DL (ref 6.4–8.2)
RBC # BLD AUTO: 4.25 10^6/UL (ref 4–5.4)
RBC # BLD AUTO: 4.31 10^6/UL (ref 4–5.4)
SODIUM SERPL-SCNC: 135 MEQ/L (ref 136–145)
SODIUM SERPL-SCNC: 138 MEQ/L (ref 136–145)
WBC # BLD AUTO: 15.4 10^3/UL (ref 4–10)
WBC # BLD AUTO: 15.4 10^3/UL (ref 4–10)

## 2022-08-25 PROCEDURE — 96375 TX/PRO/DX INJ NEW DRUG ADDON: CPT

## 2022-08-25 PROCEDURE — 80053 COMPREHEN METABOLIC PANEL: CPT

## 2022-08-25 PROCEDURE — 88304 TISSUE EXAM BY PATHOLOGIST: CPT

## 2022-08-25 PROCEDURE — 83690 ASSAY OF LIPASE: CPT

## 2022-08-25 PROCEDURE — 83605 ASSAY OF LACTIC ACID: CPT

## 2022-08-25 PROCEDURE — 96365 THER/PROPH/DIAG IV INF INIT: CPT

## 2022-08-25 PROCEDURE — 85027 COMPLETE CBC AUTOMATED: CPT

## 2022-08-25 PROCEDURE — 96376 TX/PRO/DX INJ SAME DRUG ADON: CPT

## 2022-08-25 PROCEDURE — 99284 EMERGENCY DEPT VISIT MOD MDM: CPT

## 2022-08-25 PROCEDURE — 44970 LAPAROSCOPY APPENDECTOMY: CPT

## 2022-08-25 PROCEDURE — 80048 BASIC METABOLIC PNL TOTAL CA: CPT

## 2022-08-25 PROCEDURE — 87635 SARS-COV-2 COVID-19 AMP PRB: CPT

## 2022-08-25 PROCEDURE — 74177 CT ABD & PELVIS W/CONTRAST: CPT

## 2022-08-25 PROCEDURE — 36415 COLL VENOUS BLD VENIPUNCTURE: CPT

## 2022-08-25 PROCEDURE — 85025 COMPLETE CBC W/AUTO DIFF WBC: CPT

## 2022-08-25 PROCEDURE — 96372 THER/PROPH/DIAG INJ SC/IM: CPT

## 2022-08-25 RX ADMIN — SODIUM CHLORIDE SCH MLS/HR: 9 INJECTION, SOLUTION INTRAVENOUS at 23:01

## 2022-08-25 RX ADMIN — DOCUSATE SODIUM,SENNOSIDES SCH TAB: 50; 8.6 TABLET, FILM COATED ORAL at 23:00

## 2022-08-26 VITALS — SYSTOLIC BLOOD PRESSURE: 126 MMHG | DIASTOLIC BLOOD PRESSURE: 78 MMHG

## 2022-08-26 VITALS — SYSTOLIC BLOOD PRESSURE: 128 MMHG | DIASTOLIC BLOOD PRESSURE: 80 MMHG

## 2022-08-26 VITALS — SYSTOLIC BLOOD PRESSURE: 140 MMHG | DIASTOLIC BLOOD PRESSURE: 82 MMHG

## 2022-08-26 VITALS — DIASTOLIC BLOOD PRESSURE: 80 MMHG | SYSTOLIC BLOOD PRESSURE: 130 MMHG

## 2022-08-26 LAB
BUN SERPL-MCNC: 10 MG/DL (ref 7–18)
CALCIUM SERPL-MCNC: 7.6 MG/DL (ref 8.8–10.2)
CHLORIDE SERPL-SCNC: 110 MEQ/L (ref 98–107)
CO2 SERPL-SCNC: 23 MEQ/L (ref 21–32)
CREAT SERPL-MCNC: 0.75 MG/DL (ref 0.55–1.3)
GFR SERPL CREATININE-BSD FRML MDRD: > 60 ML/MIN/{1.73_M2} (ref 45–?)
GLUCOSE SERPL-MCNC: 127 MG/DL (ref 70–100)
HCT VFR BLD AUTO: 34.8 % (ref 36–47)
HGB BLD-MCNC: 11.4 G/DL (ref 12–15.5)
MCH RBC QN AUTO: 32.5 PG (ref 27–33)
MCHC RBC AUTO-ENTMCNC: 32.8 G/DL (ref 32–36.5)
MCV RBC AUTO: 99.1 FL (ref 80–96)
PLATELET # BLD AUTO: 219 10^3/UL (ref 150–450)
POTASSIUM SERPL-SCNC: 4.2 MEQ/L (ref 3.5–5.1)
RBC # BLD AUTO: 3.51 10^6/UL (ref 4–5.4)
SODIUM SERPL-SCNC: 139 MEQ/L (ref 136–145)
WBC # BLD AUTO: 13.5 10^3/UL (ref 4–10)

## 2022-08-26 RX ADMIN — DEXTROSE MONOHYDRATE SCH MLS/HR: 5 INJECTION INTRAVENOUS at 08:17

## 2022-08-26 RX ADMIN — DEXTROSE MONOHYDRATE SCH MLS/HR: 5 INJECTION INTRAVENOUS at 01:47

## 2022-08-26 RX ADMIN — DOCUSATE SODIUM,SENNOSIDES SCH TAB: 50; 8.6 TABLET, FILM COATED ORAL at 08:17

## 2022-08-26 RX ADMIN — SODIUM CHLORIDE SCH MLS/HR: 9 INJECTION, SOLUTION INTRAVENOUS at 07:55

## 2022-10-17 ENCOUNTER — HOSPITAL ENCOUNTER (OUTPATIENT)
Dept: HOSPITAL 53 - M WHC | Age: 62
End: 2022-10-17
Attending: NURSE PRACTITIONER
Payer: COMMERCIAL

## 2022-10-17 ENCOUNTER — HOSPITAL ENCOUNTER (OUTPATIENT)
Dept: HOSPITAL 53 - M SFHCWAGY | Age: 62
End: 2022-10-17
Attending: NURSE PRACTITIONER
Payer: COMMERCIAL

## 2022-10-17 DIAGNOSIS — Z01.419: ICD-10-CM

## 2022-10-17 DIAGNOSIS — Z12.31: Primary | ICD-10-CM

## 2022-10-17 DIAGNOSIS — Z12.4: Primary | ICD-10-CM

## 2022-10-17 DIAGNOSIS — Z77.9: ICD-10-CM

## 2022-11-08 ENCOUNTER — HOSPITAL ENCOUNTER (OUTPATIENT)
Dept: HOSPITAL 53 - M SFHCPLAZ | Age: 62
End: 2022-11-08
Attending: FAMILY MEDICINE
Payer: COMMERCIAL

## 2022-11-08 DIAGNOSIS — L57.0: Primary | ICD-10-CM

## 2022-11-17 ENCOUNTER — HOSPITAL ENCOUNTER (OUTPATIENT)
Dept: HOSPITAL 53 - M PLALAB | Age: 62
End: 2022-11-17
Attending: FAMILY MEDICINE
Payer: COMMERCIAL

## 2022-11-17 DIAGNOSIS — K76.0: ICD-10-CM

## 2022-11-17 DIAGNOSIS — D72.819: Primary | ICD-10-CM

## 2022-11-17 LAB
25(OH)D3 SERPL-MCNC: 53.5 NG/ML (ref 20–100)
ALBUMIN SERPL BCG-MCNC: 3.9 G/DL (ref 3.2–5.2)
ALT SERPL W P-5'-P-CCNC: 16 U/L (ref 7–40)
BASOPHILS # BLD AUTO: 0 10^3/UL (ref 0–0.2)
BASOPHILS NFR BLD AUTO: 0.4 % (ref 0–1)
BILIRUB SERPL-MCNC: 0.5 MG/DL (ref 0.3–1.2)
BUN SERPL-MCNC: 19 MG/DL (ref 9–23)
CALCIUM SERPL-MCNC: 10.1 MG/DL (ref 8.3–10.6)
CHLORIDE SERPL-SCNC: 105 MMOL/L (ref 98–107)
CO2 SERPL-SCNC: 28 MMOL/L (ref 20–31)
CREAT SERPL-MCNC: 0.89 MG/DL (ref 0.55–1.3)
EOSINOPHIL # BLD AUTO: 0.1 10^3/UL (ref 0–0.5)
EOSINOPHIL NFR BLD AUTO: 1.5 % (ref 0–3)
GFR SERPL CREATININE-BSD FRML MDRD: > 60 ML/MIN/{1.73_M2} (ref 45–?)
GLUCOSE SERPL-MCNC: 91 MG/DL (ref 74–106)
HCT VFR BLD AUTO: 45.8 % (ref 36–47)
HGB BLD-MCNC: 14.2 G/DL (ref 12–15.5)
LYMPHOCYTES # BLD AUTO: 1.3 10^3/UL (ref 1.5–5)
LYMPHOCYTES NFR BLD AUTO: 26.4 % (ref 24–44)
MCH RBC QN AUTO: 30.2 PG (ref 27–33)
MCHC RBC AUTO-ENTMCNC: 31 G/DL (ref 32–36.5)
MCV RBC AUTO: 97.4 FL (ref 80–96)
MONOCYTES # BLD AUTO: 0.5 10^3/UL (ref 0–0.8)
MONOCYTES NFR BLD AUTO: 10.9 % (ref 2–8)
NEUTROPHILS # BLD AUTO: 2.9 10^3/UL (ref 1.5–8.5)
NEUTROPHILS NFR BLD AUTO: 60.6 % (ref 36–66)
PLATELET # BLD AUTO: 252 10^3/UL (ref 150–450)
POTASSIUM SERPL-SCNC: 4.4 MMOL/L (ref 3.5–5.1)
PROT SERPL-MCNC: 6.4 G/DL (ref 5.7–8.2)
PTH-INTACT SERPL-MCNC: 26.5 PG/ML (ref 18.5–88)
RBC # BLD AUTO: 4.7 10^6/UL (ref 4–5.4)
SODIUM SERPL-SCNC: 144 MMOL/L (ref 136–145)
T4 FREE SERPL-MCNC: 1.29 NG/DL (ref 0.89–1.76)
TSH SERPL DL<=0.005 MIU/L-ACNC: 1.24 UIU/ML (ref 0.55–4.78)
VIT B12 SERPL-MCNC: 773 PG/ML (ref 211–911)
WBC # BLD AUTO: 4.8 10^3/UL (ref 4–10)

## 2022-11-19 LAB — H PYLORI IGG SER IA-ACNC: 0.21 (ref 0–0.79)

## 2023-02-15 ENCOUNTER — HOSPITAL ENCOUNTER (OUTPATIENT)
Dept: HOSPITAL 53 - M LABSMTC | Age: 63
End: 2023-02-15
Attending: ANESTHESIOLOGY
Payer: COMMERCIAL

## 2023-02-15 DIAGNOSIS — Z01.812: Primary | ICD-10-CM

## 2023-02-15 DIAGNOSIS — Z11.52: ICD-10-CM

## 2023-02-20 ENCOUNTER — HOSPITAL ENCOUNTER (OUTPATIENT)
Dept: HOSPITAL 53 - M OPP | Age: 63
Discharge: HOME | End: 2023-02-20
Attending: INTERNAL MEDICINE
Payer: COMMERCIAL

## 2023-02-20 VITALS — BODY MASS INDEX: 19.44 KG/M2 | HEIGHT: 66.5 IN | WEIGHT: 122.4 LBS

## 2023-02-20 VITALS — DIASTOLIC BLOOD PRESSURE: 77 MMHG | SYSTOLIC BLOOD PRESSURE: 120 MMHG

## 2023-02-20 DIAGNOSIS — K57.30: ICD-10-CM

## 2023-02-20 DIAGNOSIS — M81.0: ICD-10-CM

## 2023-02-20 DIAGNOSIS — E03.9: ICD-10-CM

## 2023-02-20 DIAGNOSIS — K64.0: ICD-10-CM

## 2023-02-20 DIAGNOSIS — Z79.899: ICD-10-CM

## 2023-02-20 DIAGNOSIS — Z80.3: ICD-10-CM

## 2023-02-20 DIAGNOSIS — Z79.890: ICD-10-CM

## 2023-02-20 DIAGNOSIS — Z87.891: ICD-10-CM

## 2023-02-20 DIAGNOSIS — K58.0: ICD-10-CM

## 2023-02-20 DIAGNOSIS — Z12.11: Primary | ICD-10-CM

## 2023-07-17 ENCOUNTER — HOSPITAL ENCOUNTER (OUTPATIENT)
Dept: HOSPITAL 53 - M PLALAB | Age: 63
End: 2023-07-17
Attending: FAMILY MEDICINE
Payer: COMMERCIAL

## 2023-07-17 DIAGNOSIS — E55.9: ICD-10-CM

## 2023-07-17 DIAGNOSIS — D72.819: Primary | ICD-10-CM

## 2023-07-17 LAB
ALBUMIN SERPL BCG-MCNC: 3.8 G/DL (ref 3.2–5.2)
BASOPHILS # BLD AUTO: 0 10^3/UL (ref 0–0.2)
BASOPHILS NFR BLD AUTO: 0.5 % (ref 0–1)
BUN SERPL-MCNC: 17 MG/DL (ref 9–23)
CALCIUM SERPL-MCNC: 9 MG/DL (ref 8.3–10.6)
CHLORIDE SERPL-SCNC: 106 MMOL/L (ref 98–107)
CO2 SERPL-SCNC: 30 MMOL/L (ref 20–31)
CREAT SERPL-MCNC: 0.81 MG/DL (ref 0.55–1.3)
EOSINOPHIL # BLD AUTO: 0.1 10^3/UL (ref 0–0.5)
EOSINOPHIL NFR BLD AUTO: 1.4 % (ref 0–3)
GFR SERPL CREATININE-BSD FRML MDRD: > 60 ML/MIN/{1.73_M2} (ref 45–?)
GLUCOSE SERPL-MCNC: 86 MG/DL (ref 74–106)
HCT VFR BLD AUTO: 42.5 % (ref 36–47)
HGB BLD-MCNC: 13.6 G/DL (ref 12–15.5)
LYMPHOCYTES # BLD AUTO: 1.2 10^3/UL (ref 1.5–5)
LYMPHOCYTES NFR BLD AUTO: 21.3 % (ref 24–44)
MCH RBC QN AUTO: 31.5 PG (ref 27–33)
MCHC RBC AUTO-ENTMCNC: 32 G/DL (ref 32–36.5)
MCV RBC AUTO: 98.4 FL (ref 80–96)
MONOCYTES # BLD AUTO: 0.6 10^3/UL (ref 0–0.8)
MONOCYTES NFR BLD AUTO: 9.9 % (ref 2–8)
NEUTROPHILS # BLD AUTO: 3.8 10^3/UL (ref 1.5–8.5)
NEUTROPHILS NFR BLD AUTO: 66.7 % (ref 36–66)
PHOSPHATE SERPL-MCNC: 4.2 MG/DL (ref 2.4–5.1)
PLATELET # BLD AUTO: 271 10^3/UL (ref 150–450)
POTASSIUM SERPL-SCNC: 4.1 MMOL/L (ref 3.5–5.1)
PTH-INTACT SERPL-MCNC: 30.9 PG/ML (ref 18.5–88)
RBC # BLD AUTO: 4.32 10^6/UL (ref 4–5.4)
SODIUM SERPL-SCNC: 144 MMOL/L (ref 136–145)
WBC # BLD AUTO: 5.7 10^3/UL (ref 4–10)

## 2023-07-19 LAB
1,25(OH)2D3 SERPL-MCNC: 37.8 PG/ML (ref 24.8–81.5)
IGA SERPL-MCNC: 251 MG/DL (ref 87–352)
IGM SERPL-MCNC: 101 MG/DL (ref 26–217)

## 2023-10-17 ENCOUNTER — HOSPITAL ENCOUNTER (OUTPATIENT)
Dept: HOSPITAL 53 - M SFHCPLAZ | Age: 63
End: 2023-10-17
Attending: FAMILY MEDICINE
Payer: COMMERCIAL

## 2023-10-17 DIAGNOSIS — L57.0: Primary | ICD-10-CM

## 2023-10-17 DIAGNOSIS — D23.9: ICD-10-CM

## 2023-11-16 ENCOUNTER — HOSPITAL ENCOUNTER (OUTPATIENT)
Dept: HOSPITAL 53 - M WHC | Age: 63
End: 2023-11-16
Attending: NURSE PRACTITIONER
Payer: COMMERCIAL

## 2023-11-16 DIAGNOSIS — Z13.820: Primary | ICD-10-CM

## 2023-11-16 DIAGNOSIS — Z12.31: Primary | ICD-10-CM

## 2023-11-16 DIAGNOSIS — Z13.820: ICD-10-CM

## 2023-11-28 ENCOUNTER — HOSPITAL ENCOUNTER (OUTPATIENT)
Dept: HOSPITAL 53 - M SFHCPLAZ | Age: 63
End: 2023-11-28
Attending: FAMILY MEDICINE
Payer: COMMERCIAL

## 2023-11-28 DIAGNOSIS — L57.8: ICD-10-CM

## 2023-11-28 DIAGNOSIS — C44.622: Primary | ICD-10-CM

## 2024-02-13 ENCOUNTER — HOSPITAL ENCOUNTER (OUTPATIENT)
Dept: HOSPITAL 53 - M PLALAB | Age: 64
End: 2024-02-13
Attending: FAMILY MEDICINE
Payer: COMMERCIAL

## 2024-02-13 DIAGNOSIS — K76.0: Primary | ICD-10-CM

## 2024-02-13 DIAGNOSIS — E53.8: ICD-10-CM

## 2024-02-13 DIAGNOSIS — D75.89: ICD-10-CM

## 2024-02-13 DIAGNOSIS — E03.9: ICD-10-CM

## 2024-02-13 LAB
BASOPHILS # BLD AUTO: 0 10^3/UL (ref 0–0.2)
BASOPHILS NFR BLD AUTO: 0.5 % (ref 0–1)
EOSINOPHIL # BLD AUTO: 0.1 10^3/UL (ref 0–0.5)
EOSINOPHIL NFR BLD AUTO: 2.5 % (ref 0–3)
FERRITIN SERPL-MCNC: 16.2 NG/ML (ref 7.3–270.7)
HCT VFR BLD AUTO: 44.2 % (ref 36–47)
HCT VFR BLD AUTO: 44.4 % (ref 36–47)
HGB BLD-MCNC: 14.3 G/DL (ref 12–15.5)
LYMPHOCYTES # BLD AUTO: 1.3 10^3/UL (ref 1.5–5)
LYMPHOCYTES NFR BLD AUTO: 23.3 % (ref 24–44)
MCH RBC QN AUTO: 31.8 PG (ref 27–33)
MCHC RBC AUTO-ENTMCNC: 32.4 G/DL (ref 32–36.5)
MCV RBC AUTO: 98.2 FL (ref 80–96)
MONOCYTES # BLD AUTO: 0.6 10^3/UL (ref 0–0.8)
MONOCYTES NFR BLD AUTO: 10.6 % (ref 2–8)
NEUTROPHILS # BLD AUTO: 3.5 10^3/UL (ref 1.5–8.5)
NEUTROPHILS NFR BLD AUTO: 62.9 % (ref 36–66)
PLATELET # BLD AUTO: 263 10^3/UL (ref 150–450)
RBC # BLD AUTO: 4.5 10^6/UL (ref 4–5.4)
T4 FREE SERPL-MCNC: 1.13 NG/DL (ref 0.89–1.76)
TSH SERPL DL<=0.005 MIU/L-ACNC: 2.45 UIU/ML (ref 0.55–4.78)
WBC # BLD AUTO: 5.6 10^3/UL (ref 4–10)

## 2024-10-01 ENCOUNTER — HOSPITAL ENCOUNTER (OUTPATIENT)
Dept: HOSPITAL 53 - M PLALAB | Age: 64
End: 2024-10-01
Attending: FAMILY MEDICINE
Payer: COMMERCIAL

## 2024-10-01 DIAGNOSIS — E55.9: ICD-10-CM

## 2024-10-01 DIAGNOSIS — E03.9: ICD-10-CM

## 2024-10-01 DIAGNOSIS — D50.9: Primary | ICD-10-CM

## 2024-10-01 LAB
25(OH)D3 SERPL-MCNC: 77.3 NG/ML (ref 20–100)
ALBUMIN SERPL BCG-MCNC: 3.7 G/DL (ref 3.2–5.2)
ALP SERPL-CCNC: 62 U/L (ref 46–116)
ALT SERPL W P-5'-P-CCNC: 14 U/L (ref 7–40)
AST SERPL-CCNC: 16 U/L (ref ?–34)
BASOPHILS # BLD AUTO: 0 10^3/UL (ref 0–0.2)
BASOPHILS NFR BLD AUTO: 0.4 % (ref 0–1)
BILIRUB SERPL-MCNC: 0.5 MG/DL (ref 0.3–1.2)
BUN SERPL-MCNC: 16 MG/DL (ref 9–23)
CALCIUM SERPL-MCNC: 9.6 MG/DL (ref 8.3–10.6)
CHLORIDE SERPL-SCNC: 109 MMOL/L (ref 98–107)
CO2 SERPL-SCNC: 31 MMOL/L (ref 20–31)
CREAT SERPL-MCNC: 0.79 MG/DL (ref 0.55–1.3)
EOSINOPHIL # BLD AUTO: 0.1 10^3/UL (ref 0–0.5)
EOSINOPHIL NFR BLD AUTO: 2.2 % (ref 0–3)
FERRITIN SERPL-MCNC: 39.8 NG/ML (ref 7.3–270.7)
GFR SERPL CREATININE-BSD FRML MDRD: > 60 ML/MIN/{1.73_M2} (ref 45–?)
GLUCOSE SERPL-MCNC: 91 MG/DL (ref 74–106)
HCT VFR BLD AUTO: 44 % (ref 36–47)
HCT VFR BLD AUTO: 45 % (ref 36–47)
HGB BLD-MCNC: 14.1 G/DL (ref 12–15.5)
LYMPHOCYTES # BLD AUTO: 1.3 10^3/UL (ref 1.5–5)
LYMPHOCYTES NFR BLD AUTO: 28.3 % (ref 24–44)
MCH RBC QN AUTO: 31.2 PG (ref 27–33)
MCHC RBC AUTO-ENTMCNC: 32 G/DL (ref 32–36.5)
MCV RBC AUTO: 97.3 FL (ref 80–96)
MONOCYTES # BLD AUTO: 0.5 10^3/UL (ref 0–0.8)
MONOCYTES NFR BLD AUTO: 11.1 % (ref 2–8)
NEUTROPHILS # BLD AUTO: 2.7 10^3/UL (ref 1.5–8.5)
NEUTROPHILS NFR BLD AUTO: 57.8 % (ref 36–66)
PLATELET # BLD AUTO: 277 10^3/UL (ref 150–450)
POTASSIUM SERPL-SCNC: 4.3 MMOL/L (ref 3.5–5.1)
PROT SERPL-MCNC: 6.7 G/DL (ref 5.7–8.2)
PTH-INTACT SERPL-MCNC: 26.4 PG/ML (ref 18.5–88)
RBC # BLD AUTO: 4.52 10^6/UL (ref 4–5.4)
SODIUM SERPL-SCNC: 142 MMOL/L (ref 136–145)
T4 FREE SERPL-MCNC: 1.29 NG/DL (ref 0.89–1.76)
TSH SERPL DL<=0.005 MIU/L-ACNC: 1.8 UIU/ML (ref 0.55–4.78)
VIT B12 SERPL-MCNC: 914 PG/ML (ref 211–911)
WBC # BLD AUTO: 4.6 10^3/UL (ref 4–10)

## 2024-12-17 NOTE — REPPI
INDICATION:

PAIN UPPER LEFT ARM



COMPARISON:

None.



TECHNIQUE:

AP and lateral views of the left humerus.



FINDINGS:

The osseous structures and joint spaces are intact and age-appropriate.  Mild

arthritic changes at the elbow noted.  There is no evidence for acute fracture or

dislocation.  Surrounding soft tissues are unremarkable.  No subcutaneous emphysema or

radiodense foreign body.



IMPRESSION:

.  No acute fracture or dislocation.





<Electronically signed by Junior Llanes > 03/08/21 0647 Pt. Is taking Plavix.  RX pended.